# Patient Record
Sex: MALE | Race: WHITE | Employment: OTHER | URBAN - METROPOLITAN AREA
[De-identification: names, ages, dates, MRNs, and addresses within clinical notes are randomized per-mention and may not be internally consistent; named-entity substitution may affect disease eponyms.]

---

## 2025-05-17 ENCOUNTER — APPOINTMENT (OUTPATIENT)
Dept: GENERAL RADIOLOGY | Age: 75
End: 2025-05-17
Payer: MEDICARE

## 2025-05-17 ENCOUNTER — APPOINTMENT (OUTPATIENT)
Dept: CT IMAGING | Age: 75
End: 2025-05-17
Payer: MEDICARE

## 2025-05-17 ENCOUNTER — HOSPITAL ENCOUNTER (OUTPATIENT)
Age: 75
Setting detail: OBSERVATION
Discharge: HOME | End: 2025-05-19
Attending: EMERGENCY MEDICINE | Admitting: HOSPITALIST
Payer: MEDICARE

## 2025-05-17 DIAGNOSIS — I65.01 OCCLUSION OF RIGHT VERTEBRAL ARTERY: Primary | ICD-10-CM

## 2025-05-17 DIAGNOSIS — I63.011 CEREBROVASCULAR ACCIDENT (CVA) DUE TO THROMBOSIS OF RIGHT VERTEBRAL ARTERY (HCC): ICD-10-CM

## 2025-05-17 PROBLEM — G45.9 TIA (TRANSIENT ISCHEMIC ATTACK): Status: ACTIVE | Noted: 2025-05-17

## 2025-05-17 LAB
ALBUMIN SERPL-MCNC: 3.9 G/DL (ref 3.4–5)
ALBUMIN/GLOB SERPL: 1.2 {RATIO} (ref 1.1–2.2)
ALP SERPL-CCNC: 72 U/L (ref 40–129)
ALT SERPL-CCNC: 17 U/L (ref 10–40)
ANION GAP SERPL CALCULATED.3IONS-SCNC: 9 MMOL/L (ref 3–16)
AST SERPL-CCNC: 19 U/L (ref 15–37)
BASOPHILS # BLD: 0.1 K/UL (ref 0–0.2)
BASOPHILS NFR BLD: 0.8 %
BILIRUB SERPL-MCNC: 0.5 MG/DL (ref 0–1)
BUN SERPL-MCNC: 18 MG/DL (ref 7–20)
CALCIUM SERPL-MCNC: 9.7 MG/DL (ref 8.3–10.6)
CHLORIDE SERPL-SCNC: 102 MMOL/L (ref 99–110)
CO2 SERPL-SCNC: 26 MMOL/L (ref 21–32)
CREAT SERPL-MCNC: 1.1 MG/DL (ref 0.8–1.3)
DEPRECATED RDW RBC AUTO: 15.1 % (ref 12.4–15.4)
EOSINOPHIL # BLD: 0.3 K/UL (ref 0–0.6)
EOSINOPHIL NFR BLD: 3.8 %
GFR SERPLBLD CREATININE-BSD FMLA CKD-EPI: 70 ML/MIN/{1.73_M2}
GLUCOSE BLD-MCNC: 224 MG/DL (ref 70–99)
GLUCOSE SERPL-MCNC: 215 MG/DL (ref 70–99)
HCT VFR BLD AUTO: 40 % (ref 40.5–52.5)
HGB BLD-MCNC: 13.4 G/DL (ref 13.5–17.5)
LYMPHOCYTES # BLD: 1.3 K/UL (ref 1–5.1)
LYMPHOCYTES NFR BLD: 18.4 %
MCH RBC QN AUTO: 28.8 PG (ref 26–34)
MCHC RBC AUTO-ENTMCNC: 33.6 G/DL (ref 31–36)
MCV RBC AUTO: 85.7 FL (ref 80–100)
MONOCYTES # BLD: 0.5 K/UL (ref 0–1.3)
MONOCYTES NFR BLD: 7 %
NEUTROPHILS # BLD: 5.1 K/UL (ref 1.7–7.7)
NEUTROPHILS NFR BLD: 70 %
PERFORMED ON: ABNORMAL
PLATELET # BLD AUTO: 226 K/UL (ref 135–450)
PMV BLD AUTO: 7.8 FL (ref 5–10.5)
POTASSIUM SERPL-SCNC: 4.1 MMOL/L (ref 3.5–5.1)
PROT SERPL-MCNC: 7.1 G/DL (ref 6.4–8.2)
RBC # BLD AUTO: 4.67 M/UL (ref 4.2–5.9)
SODIUM SERPL-SCNC: 137 MMOL/L (ref 136–145)
TROPONIN, HIGH SENSITIVITY: 20 NG/L (ref 0–22)
TROPONIN, HIGH SENSITIVITY: 33 NG/L (ref 0–22)
WBC # BLD AUTO: 7.2 K/UL (ref 4–11)

## 2025-05-17 PROCEDURE — 94640 AIRWAY INHALATION TREATMENT: CPT

## 2025-05-17 PROCEDURE — 94761 N-INVAS EAR/PLS OXIMETRY MLT: CPT

## 2025-05-17 PROCEDURE — G0378 HOSPITAL OBSERVATION PER HR: HCPCS

## 2025-05-17 PROCEDURE — 2500000003 HC RX 250 WO HCPCS: Performed by: HOSPITALIST

## 2025-05-17 PROCEDURE — 70498 CT ANGIOGRAPHY NECK: CPT

## 2025-05-17 PROCEDURE — 84484 ASSAY OF TROPONIN QUANT: CPT

## 2025-05-17 PROCEDURE — 6370000000 HC RX 637 (ALT 250 FOR IP): Performed by: HOSPITALIST

## 2025-05-17 PROCEDURE — 70450 CT HEAD/BRAIN W/O DYE: CPT

## 2025-05-17 PROCEDURE — 71045 X-RAY EXAM CHEST 1 VIEW: CPT

## 2025-05-17 PROCEDURE — 80053 COMPREHEN METABOLIC PANEL: CPT

## 2025-05-17 PROCEDURE — 99285 EMERGENCY DEPT VISIT HI MDM: CPT

## 2025-05-17 PROCEDURE — 6360000004 HC RX CONTRAST MEDICATION: Performed by: PHYSICIAN ASSISTANT

## 2025-05-17 PROCEDURE — 93005 ELECTROCARDIOGRAM TRACING: CPT | Performed by: EMERGENCY MEDICINE

## 2025-05-17 PROCEDURE — 85025 COMPLETE CBC W/AUTO DIFF WBC: CPT

## 2025-05-17 PROCEDURE — 6370000000 HC RX 637 (ALT 250 FOR IP): Performed by: PHYSICIAN ASSISTANT

## 2025-05-17 RX ORDER — ONDANSETRON 2 MG/ML
4 INJECTION INTRAMUSCULAR; INTRAVENOUS EVERY 6 HOURS PRN
Status: DISCONTINUED | OUTPATIENT
Start: 2025-05-17 | End: 2025-05-19 | Stop reason: HOSPADM

## 2025-05-17 RX ORDER — ATORVASTATIN CALCIUM 80 MG/1
80 TABLET, FILM COATED ORAL NIGHTLY
Status: DISCONTINUED | OUTPATIENT
Start: 2025-05-17 | End: 2025-05-19 | Stop reason: HOSPADM

## 2025-05-17 RX ORDER — ASPIRIN 325 MG
325 TABLET ORAL ONCE
Status: COMPLETED | OUTPATIENT
Start: 2025-05-17 | End: 2025-05-17

## 2025-05-17 RX ORDER — IOPAMIDOL 755 MG/ML
75 INJECTION, SOLUTION INTRAVASCULAR
Status: COMPLETED | OUTPATIENT
Start: 2025-05-17 | End: 2025-05-17

## 2025-05-17 RX ORDER — ENOXAPARIN SODIUM 100 MG/ML
40 INJECTION SUBCUTANEOUS DAILY
Status: DISCONTINUED | OUTPATIENT
Start: 2025-05-18 | End: 2025-05-19 | Stop reason: HOSPADM

## 2025-05-17 RX ORDER — POLYETHYLENE GLYCOL 3350 17 G/17G
17 POWDER, FOR SOLUTION ORAL DAILY PRN
Status: DISCONTINUED | OUTPATIENT
Start: 2025-05-17 | End: 2025-05-19 | Stop reason: HOSPADM

## 2025-05-17 RX ORDER — LISINOPRIL 5 MG/1
5 TABLET ORAL EVERY MORNING
Status: DISCONTINUED | OUTPATIENT
Start: 2025-05-18 | End: 2025-05-19 | Stop reason: HOSPADM

## 2025-05-17 RX ORDER — SODIUM CHLORIDE 0.9 % (FLUSH) 0.9 %
5-40 SYRINGE (ML) INJECTION EVERY 12 HOURS SCHEDULED
Status: DISCONTINUED | OUTPATIENT
Start: 2025-05-17 | End: 2025-05-19 | Stop reason: HOSPADM

## 2025-05-17 RX ORDER — ONDANSETRON 4 MG/1
4 TABLET, ORALLY DISINTEGRATING ORAL EVERY 8 HOURS PRN
Status: DISCONTINUED | OUTPATIENT
Start: 2025-05-17 | End: 2025-05-19 | Stop reason: HOSPADM

## 2025-05-17 RX ORDER — INSULIN LISPRO 100 [IU]/ML
0-8 INJECTION, SOLUTION INTRAVENOUS; SUBCUTANEOUS
Status: DISCONTINUED | OUTPATIENT
Start: 2025-05-17 | End: 2025-05-19 | Stop reason: HOSPADM

## 2025-05-17 RX ORDER — CALCIUM CARBONATE/VITAMIN D3 600 MG-10
1 TABLET ORAL EVERY MORNING
COMMUNITY

## 2025-05-17 RX ORDER — ASPIRIN 300 MG/1
300 SUPPOSITORY RECTAL DAILY
Status: DISCONTINUED | OUTPATIENT
Start: 2025-05-18 | End: 2025-05-19 | Stop reason: HOSPADM

## 2025-05-17 RX ORDER — SODIUM CHLORIDE 0.9 % (FLUSH) 0.9 %
5-40 SYRINGE (ML) INJECTION PRN
Status: DISCONTINUED | OUTPATIENT
Start: 2025-05-17 | End: 2025-05-19 | Stop reason: HOSPADM

## 2025-05-17 RX ORDER — DEXTROSE MONOHYDRATE 100 MG/ML
INJECTION, SOLUTION INTRAVENOUS CONTINUOUS PRN
Status: DISCONTINUED | OUTPATIENT
Start: 2025-05-17 | End: 2025-05-19 | Stop reason: HOSPADM

## 2025-05-17 RX ORDER — BUDESONIDE AND FORMOTEROL FUMARATE DIHYDRATE 160; 4.5 UG/1; UG/1
2 AEROSOL RESPIRATORY (INHALATION)
Status: DISCONTINUED | OUTPATIENT
Start: 2025-05-17 | End: 2025-05-19 | Stop reason: HOSPADM

## 2025-05-17 RX ORDER — GLUCAGON 1 MG/ML
1 KIT INJECTION PRN
Status: DISCONTINUED | OUTPATIENT
Start: 2025-05-17 | End: 2025-05-19 | Stop reason: HOSPADM

## 2025-05-17 RX ORDER — ASPIRIN 81 MG/1
81 TABLET, CHEWABLE ORAL DAILY
Status: DISCONTINUED | OUTPATIENT
Start: 2025-05-18 | End: 2025-05-19 | Stop reason: HOSPADM

## 2025-05-17 RX ORDER — SODIUM CHLORIDE 9 MG/ML
INJECTION, SOLUTION INTRAVENOUS PRN
Status: DISCONTINUED | OUTPATIENT
Start: 2025-05-17 | End: 2025-05-19 | Stop reason: HOSPADM

## 2025-05-17 RX ORDER — ATORVASTATIN CALCIUM 20 MG/1
20 TABLET, FILM COATED ORAL EVERY MORNING
COMMUNITY

## 2025-05-17 RX ADMIN — Medication 2 PUFF: at 20:07

## 2025-05-17 RX ADMIN — ATORVASTATIN CALCIUM 80 MG: 80 TABLET, FILM COATED ORAL at 21:46

## 2025-05-17 RX ADMIN — INSULIN LISPRO 2 UNITS: 100 INJECTION, SOLUTION INTRAVENOUS; SUBCUTANEOUS at 21:46

## 2025-05-17 RX ADMIN — ASPIRIN 325 MG: 325 TABLET ORAL at 16:00

## 2025-05-17 RX ADMIN — SODIUM CHLORIDE, PRESERVATIVE FREE 10 ML: 5 INJECTION INTRAVENOUS at 21:46

## 2025-05-17 RX ADMIN — IOPAMIDOL 75 ML: 755 INJECTION, SOLUTION INTRAVENOUS at 14:42

## 2025-05-17 ASSESSMENT — PAIN - FUNCTIONAL ASSESSMENT: PAIN_FUNCTIONAL_ASSESSMENT: NONE - DENIES PAIN

## 2025-05-17 NOTE — ED NOTES
Patient Name: Eric Christina  : 1950 74 y.o.  MRN: [id number]  ED Room #: ED-0021/21     Chief complaint:   Chief Complaint   Patient presents with    Dizziness     In by ems, pt states he started with dizziness and light-headed, like he was about to pass out about 1140am start time .     Hospital Problem/Diagnosis:   Hospital Problems           Last Modified POA    * (Principal) TIA (transient ischemic attack) 2025 Yes         O2 Flow Rate:    (if applicable)  Cardiac Rhythm:   (if applicable)  Active LDA's:   Peripheral IV 25 Right Antecubital (Active)   Site Assessment Clean, dry & intact 25 1347   Line Status Blood return noted 25 1347   Line Care Cap changed 25 1347            How does patient ambulate? Low Fall Risk (Ambulates by themselves without support    2. How does patient take pills? Whole with Water    3. Is patient alert? Alert    4. Is patient oriented? To Person, To Place, To Time, and To Situation    5.   Patient arrived from:  home  Facility Name: ___________________________________________    6. If patient is disoriented or from a Skill Nursing Facility has family been notified of admission? No    7. Patient belongings? Belongings: Cell Phone and Clothing    Disposition of belongings? Kept with Patient     8. Any specific patient or family belongings/needs/dynamics?   a. none    9. Miscellaneous comments/pending orders?  a. none      If there are any additional questions please reach out to the Emergency Department.

## 2025-05-17 NOTE — ED PROVIDER NOTES
[facility] EMERGENCY DEPARTMENT  EMERGENCY DEPARTMENT ENCOUNTER        Pt Name: Eric Christina  MRN: [id number]  Birthdate 1950  Date of evaluation: 5/17/2025  Provider: Betsy Adams PA-C  PCP: Pineda Hunt MD  Note Started: 1:52 PM EDT 5/17/25       I have seen and evaluated this patient with my supervising physician Vidya Handy MD.      CHIEF COMPLAINT       Chief Complaint   Patient presents with    Dizziness     In by ems, pt states he started with dizziness and light-headed, like he was about to pass out about 1140am start time .       HISTORY OF PRESENT ILLNESS: 1 or more Elements     History From: patient   Limitations to history : None    Eric Christina is a 74 y.o. male who presents for evaluation of sudden onset lightheadedness that started around 1130 this afternoon.  He had been standing outside for about 20 minutes prior to onset of symptoms.  States it lasted approximately 45 minutes.  States he has bouts of dizziness as lightheadedness frequently but it is never been this severe or lasted this long.  He denies loss of consciousness.  States that he sat down, started pouring sweat and became nauseous, no vomiting.  No spinning type dizziness.  No focal weakness, visual disturbances, facial asymmetry, speech difficulty or syncope.  No chest pain or shortness of breath.  Patient is now feeling completely normal.  No other complaints or concerns at this time.    Nursing Notes were all reviewed and agreed with or any disagreements were addressed in the HPI.    REVIEW OF SYSTEMS :      Review of Systems   Constitutional:  Positive for diaphoresis. Negative for appetite change, chills and fever.   HENT:  Negative for congestion and rhinorrhea.    Eyes:  Negative for visual disturbance.   Respiratory:  Negative for cough, shortness of breath and wheezing.    Cardiovascular:  Negative for chest pain.   Gastrointestinal:  Positive for nausea. Negative for abdominal pain,

## 2025-05-17 NOTE — PROGRESS NOTES
Pharmacy Home Medication Reconciliation Note    A medication reconciliation has been completed for Eric Christina 1950    Pharmacy: [address]  Information provided by: patient    The patient's home medication list is as follows:  No current facility-administered medications on file prior to encounter.     Current Outpatient Medications on File Prior to Encounter   Medication Sig Dispense Refill    Omega 3 1200 MG CAPS Take 1 capsule by mouth every morning      atorvastatin (LIPITOR) 20 MG tablet Take 1 tablet by mouth every morning      ADVAIR DISKUS 250-50 MCG/DOSE AEPB INHALE 1 PUFF INTO THE LUNGS TWO TIMES A DAY 60 each 5    Calcium Carbonate-Vit D-Min (CALTRATE 600+D PLUS) 600-400 MG-UNIT TABS Take 1 tablet by mouth every morning      lisinopril (PRINIVIL;ZESTRIL) 5 MG tablet Take 1 tablet by mouth every morning      metformin (GLUCOPHAGE) 500 MG tablet Take 3 tablets by mouth daily (with breakfast)      pseudoephedrine-guaifenesin (MUCINEX D)  MG TB12 Take 2 tablets by mouth every 12 hours as needed for Congestion      [DISCONTINUED] fluticasone-salmeterol (ADVAIR DISKUS) 250-50 MCG/DOSE MISC Inhale 1 Puff into the lungs 2 times daily.      [DISCONTINUED] FISH OIL by Does not apply route daily.         Of note, patient took all AM meds except Advair prior to ED arrival.    Timing of last doses updated.    Thank you,  Davy Jones, Pharmacy Intern

## 2025-05-17 NOTE — H&P
mouth every morning      metformin (GLUCOPHAGE) 500 MG tablet Take 3 tablets by mouth daily (with breakfast)      pseudoephedrine-guaifenesin (MUCINEX D)  MG TB12 Take 2 tablets by mouth every 12 hours as needed for Congestion         Allergies:  No Known Allergies     Social History:   reports that he has never smoked. He has never been exposed to tobacco smoke. He has never used smokeless tobacco. He reports current alcohol use. He reports that he does not use drugs.     Family History:  family history is not on file. ,     Physical Exam:  BP (!) 159/85   Pulse 79   Temp 98.6 °F (37 °C)   Resp 19   Ht 1.778 m (5' 10\")   Wt 90.7 kg (200 lb)   SpO2 93%   BMI 28.70 kg/m²     General appearance:  Appears comfortable. Well nourished  Eyes: Sclera clear, pupils equal  ENT: Moist mucus membranes, no thrush. Trachea midline.  Cardiovascular: Regular rhythm, normal S1, S2. No murmur, gallop, rub. No edema in lower extremities  Respiratory: Clear to auscultation bilaterally, no wheeze, good inspiratory effort  Gastrointestinal: Abdomen soft, non-tender, not distended, normal bowel sounds  Musculoskeletal: No cyanosis in digits, neck supple  Neurology: Cranial nerves grossly intact. Alert and oriented in time, place and person. No speech or motor deficits  Psychiatry: Appropriate affect. Not agitated  Skin: Warm, dry, normal turgor, no rash  NIH scale of 1  Labs:  CBC:   Lab Results   Component Value Date/Time    WBC 7.2 05/17/2025 01:49 PM    RBC 4.67 05/17/2025 01:49 PM    HGB 13.4 05/17/2025 01:49 PM    HCT 40.0 05/17/2025 01:49 PM    MCV 85.7 05/17/2025 01:49 PM    MCH 28.8 05/17/2025 01:49 PM    MCHC 33.6 05/17/2025 01:49 PM    RDW 15.1 05/17/2025 01:49 PM     05/17/2025 01:49 PM    MPV 7.8 05/17/2025 01:49 PM     BMP:    Lab Results   Component Value Date/Time     05/17/2025 01:49 PM    K 4.1 05/17/2025 01:49 PM     05/17/2025 01:49 PM    CO2 26 05/17/2025 01:49 PM    BUN 18 05/17/2025

## 2025-05-17 NOTE — PLAN OF CARE
Problem: Chronic Conditions and Co-morbidities  Goal: Patient's chronic conditions and co-morbidity symptoms are monitored and maintained or improved  Outcome: Progressing     Problem: Discharge Planning  Goal: Discharge to home or other facility with appropriate resources  Outcome: Progressing     Problem: Neurosensory - Adult  Goal: Achieves stable or improved neurological status  Outcome: Progressing     Problem: Cardiovascular - Adult  Goal: Maintains optimal cardiac output and hemodynamic stability  Outcome: Progressing  Goal: Absence of cardiac dysrhythmias or at baseline  Outcome: Progressing     Problem: Metabolic/Fluid and Electrolytes - Adult  Goal: Electrolytes maintained within normal limits  Outcome: Progressing  Goal: Glucose maintained within prescribed range  Outcome: Progressing     Problem: Safety - Adult  Goal: Free from fall injury  Outcome: Progressing

## 2025-05-18 LAB
ANION GAP SERPL CALCULATED.3IONS-SCNC: 11 MMOL/L (ref 3–16)
BUN SERPL-MCNC: 17 MG/DL (ref 7–20)
CALCIUM SERPL-MCNC: 9 MG/DL (ref 8.3–10.6)
CHLORIDE SERPL-SCNC: 101 MMOL/L (ref 99–110)
CHOLEST SERPL-MCNC: 226 MG/DL (ref 0–199)
CO2 SERPL-SCNC: 24 MMOL/L (ref 21–32)
CREAT SERPL-MCNC: 0.9 MG/DL (ref 0.8–1.3)
DEPRECATED RDW RBC AUTO: 15 % (ref 12.4–15.4)
EKG ATRIAL RATE: 79 BPM
EKG DIAGNOSIS: NORMAL
EKG P AXIS: 67 DEGREES
EKG P-R INTERVAL: 182 MS
EKG Q-T INTERVAL: 410 MS
EKG QRS DURATION: 102 MS
EKG QTC CALCULATION (BAZETT): 470 MS
EKG R AXIS: 10 DEGREES
EKG T AXIS: 141 DEGREES
EKG VENTRICULAR RATE: 79 BPM
EST. AVERAGE GLUCOSE BLD GHB EST-MCNC: 246 MG/DL
GFR SERPLBLD CREATININE-BSD FMLA CKD-EPI: 89 ML/MIN/{1.73_M2}
GLUCOSE BLD-MCNC: 138 MG/DL (ref 70–99)
GLUCOSE BLD-MCNC: 212 MG/DL (ref 70–99)
GLUCOSE BLD-MCNC: 235 MG/DL (ref 70–99)
GLUCOSE BLD-MCNC: 244 MG/DL (ref 70–99)
GLUCOSE SERPL-MCNC: 136 MG/DL (ref 70–99)
HBA1C MFR BLD: 10.2 %
HCT VFR BLD AUTO: 39.4 % (ref 40.5–52.5)
HDLC SERPL-MCNC: 38 MG/DL (ref 40–60)
HGB BLD-MCNC: 13 G/DL (ref 13.5–17.5)
LDLC SERPL CALC-MCNC: 157 MG/DL
MCH RBC QN AUTO: 28.9 PG (ref 26–34)
MCHC RBC AUTO-ENTMCNC: 33.1 G/DL (ref 31–36)
MCV RBC AUTO: 87.5 FL (ref 80–100)
PERFORMED ON: ABNORMAL
PLATELET # BLD AUTO: 212 K/UL (ref 135–450)
PMV BLD AUTO: 7.6 FL (ref 5–10.5)
POTASSIUM SERPL-SCNC: 4.1 MMOL/L (ref 3.5–5.1)
RBC # BLD AUTO: 4.5 M/UL (ref 4.2–5.9)
SODIUM SERPL-SCNC: 136 MMOL/L (ref 136–145)
TRIGL SERPL-MCNC: 155 MG/DL (ref 0–150)
VLDLC SERPL CALC-MCNC: 31 MG/DL
WBC # BLD AUTO: 5 K/UL (ref 4–11)

## 2025-05-18 PROCEDURE — G0378 HOSPITAL OBSERVATION PER HR: HCPCS

## 2025-05-18 PROCEDURE — 80061 LIPID PANEL: CPT

## 2025-05-18 PROCEDURE — 80048 BASIC METABOLIC PNL TOTAL CA: CPT

## 2025-05-18 PROCEDURE — 2500000003 HC RX 250 WO HCPCS: Performed by: HOSPITALIST

## 2025-05-18 PROCEDURE — 36415 COLL VENOUS BLD VENIPUNCTURE: CPT

## 2025-05-18 PROCEDURE — 94761 N-INVAS EAR/PLS OXIMETRY MLT: CPT

## 2025-05-18 PROCEDURE — 92523 SPEECH SOUND LANG COMPREHEN: CPT

## 2025-05-18 PROCEDURE — 94640 AIRWAY INHALATION TREATMENT: CPT

## 2025-05-18 PROCEDURE — 96372 THER/PROPH/DIAG INJ SC/IM: CPT

## 2025-05-18 PROCEDURE — 83036 HEMOGLOBIN GLYCOSYLATED A1C: CPT

## 2025-05-18 PROCEDURE — 6370000000 HC RX 637 (ALT 250 FOR IP): Performed by: HOSPITALIST

## 2025-05-18 PROCEDURE — 85027 COMPLETE CBC AUTOMATED: CPT

## 2025-05-18 PROCEDURE — 97165 OT EVAL LOW COMPLEX 30 MIN: CPT

## 2025-05-18 PROCEDURE — 99223 1ST HOSP IP/OBS HIGH 75: CPT | Performed by: PSYCHIATRY & NEUROLOGY

## 2025-05-18 PROCEDURE — 97530 THERAPEUTIC ACTIVITIES: CPT

## 2025-05-18 PROCEDURE — 6360000002 HC RX W HCPCS: Performed by: HOSPITALIST

## 2025-05-18 PROCEDURE — 92610 EVALUATE SWALLOWING FUNCTION: CPT

## 2025-05-18 PROCEDURE — 93010 ELECTROCARDIOGRAM REPORT: CPT | Performed by: INTERNAL MEDICINE

## 2025-05-18 RX ADMIN — Medication 2 PUFF: at 08:08

## 2025-05-18 RX ADMIN — ENOXAPARIN SODIUM 40 MG: 100 INJECTION SUBCUTANEOUS at 08:49

## 2025-05-18 RX ADMIN — INSULIN LISPRO 2 UNITS: 100 INJECTION, SOLUTION INTRAVENOUS; SUBCUTANEOUS at 21:48

## 2025-05-18 RX ADMIN — ATORVASTATIN CALCIUM 80 MG: 80 TABLET, FILM COATED ORAL at 20:20

## 2025-05-18 RX ADMIN — SODIUM CHLORIDE, PRESERVATIVE FREE 10 ML: 5 INJECTION INTRAVENOUS at 20:20

## 2025-05-18 RX ADMIN — ASPIRIN 81 MG: 81 TABLET, CHEWABLE ORAL at 08:50

## 2025-05-18 RX ADMIN — INSULIN LISPRO 2 UNITS: 100 INJECTION, SOLUTION INTRAVENOUS; SUBCUTANEOUS at 12:23

## 2025-05-18 RX ADMIN — SODIUM CHLORIDE, PRESERVATIVE FREE 10 ML: 5 INJECTION INTRAVENOUS at 08:50

## 2025-05-18 RX ADMIN — Medication 2 PUFF: at 20:38

## 2025-05-18 RX ADMIN — INSULIN LISPRO 2 UNITS: 100 INJECTION, SOLUTION INTRAVENOUS; SUBCUTANEOUS at 17:10

## 2025-05-18 RX ADMIN — LISINOPRIL 5 MG: 5 TABLET ORAL at 08:50

## 2025-05-18 NOTE — PROGRESS NOTES
Physical Therapy  Orders received and chart reviewed. OT completed evaluation and reports pt was independent without any further needs. Orders discharged.   Thanks,  Latasha Will

## 2025-05-18 NOTE — PROGRESS NOTES
ADVANCED CARE PLANNING    Name:Eric Christina       :  1950              MRN:  [id number]        Purpose of Encounter: Advanced care planning in light of hospitalization  Parties In Attendance: Patient,    Decisional Capacity: Yes  Subjective: Patient  understand that this conversation is to address long term care goal  Objective:   There is an extra vaccine for uncontrolled hypertension permissive hypertension.  Given his high comorbid condition diabetes  Goals of Care Determination: Patient would pursue CPR and Intubation if required.       Advanced Care Planning Documents: = Full code    Vidya Handy MD  2025 4:48 PM    Plan: Will notify Pineda Hunt MD of change in care plan. Will look at further interventions as needed.   Code Status: At this time patient wishes to be Full Code  Time Spent with Patient: 17 minutes      Electronically signed by Vidya Handy MD on 2025 at 4:48 PM  Thank you Pineda Hunt MD for the opportunity to be involved in this patient's care.

## 2025-05-18 NOTE — PROGRESS NOTES
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   has no past surgical history on file.  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 MRI pending  Pain:pt reports \" neuropathy in feet act up\"                                 Recommendations      Recommended Diet and Intervention 2025:  Diet Solids Recommendation:  Regular texture diet  Liquid Consistency Recommendation:  Thin liquids  Recommended form of Meds: as desired       Compensatory strategies: Eat or Feed Slowly, Remain Upright 30-45 min , Upright as possible with all PO intake     Discharge Recommendations:  Do not anticipate dysphagia tx needs at d/c    History/Course of Treatment     H&P:    Eric Christina is a 74 y.o. male who presented with presents today with complaints of dizziness lightheadedness.   Patient describes dizziness lightheadedness. Lasted for about 45 minutes and patient does endorse that she does get dizziness on and off but has never lasted this long. No loss of conscious she sat down she was pouring and sweat became nauseous. No chest pain no shortness of breath no prodromal syndrome no loss of conscious   Consults: neurology: consult documentation noted    Imaging:  Chest X-ray:

## 2025-05-18 NOTE — PROGRESS NOTES
V2.0    [facility] Progress Note      Name:  Eric Christina /Age/Sex: 1950  (74 y.o. male)   MRN & CSN:  [id number] & [id number] Encounter Date/Time: 2025 11:35 AM EDT   Location:  [facility]3366/3366-01 PCP: Pineda Hunt MD     Attending:Vidya Handy MD       Hospital Day: 2    Assessment and Recommendations   Eric Christina is a 74 y.o. male who presents with TIA (transient ischemic attack)      Plan:     Dizziness.  Currently symptoms resolved.  Concern for posterior circulation stroke initially.  CT head noted.  Admit for further evaluation.  Continue aspirin and statin.  MRI echo ordered neurology consult    Hypertension  Blood pressure still running high we will readjust slowly  4 hours    Diabetes.  Continue sliding scale monitor for hypoglycemia        Diet ADULT DIET; Regular; 5 carb choices (75 gm/meal)   DVT Prophylaxis    Code Status Full Code   Disposition 1 more day pending MRI echo         Personally reviewed Lab Studies and Imaging         Subjective:     Chief Complaint:     Eric Christina is a 74 y.o. male who presents with resting no symptoms currently.      Review of Systems:      Pertinent positives and negatives discussed in HPI    Objective:     Intake/Output Summary (Last 24 hours) at 2025 1135  Last data filed at 2025 0850  Gross per 24 hour   Intake 255 ml   Output --   Net 255 ml      Vitals:   Vitals:    25 0417 25 0730 25 0749 25 0809   BP: (!) 166/87 (!) 174/98     Pulse: 72 70  70   Resp: 16 16  16   Temp: 97.7 °F (36.5 °C) 97.7 °F (36.5 °C)     TempSrc: Oral Oral     SpO2: 96% 97%  97%   Weight:   85.7 kg (189 lb)    Height:             Physical Exam:      General: NAD  Eyes: EOMI  ENT: neck supple  Cardiovascular: Regular rate.  Respiratory: Clear to auscultation  Gastrointestinal: Soft, non tender  Genitourinary: no suprapubic tenderness  Musculoskeletal: No edema  Skin: warm, dry  Neuro: Alert.  Psych: Mood appropriate.

## 2025-05-18 NOTE — ED PROVIDER NOTES
In addition to the advanced practice provider, I personally saw Eric Christina and performed a substantive portion of the visit including all aspects of the medical decision making. I made/approved the management plan and take responsibility for the patient management    Briefly, this is a 74 y.o. male here for dizziness.  Patient states he has had issues with dizziness ongoing off and on for the past several weeks, typically worsened in the morning and with position change.  Feels lightheaded, denies ever having any loss of consciousness.  Symptoms are more severe beginning around 1130 today at which point he came to the hospital for evaluation.  At time of my evaluation he reports feeling completely back to normal and has no other symptomatic complaints.    On exam, patient afebrile and nontoxic. No distress. Heart RRR. Lungs CTAB. Abdomen soft, nondistended, nontender to palpation in all quadrants. A&Ox4. Speech clear, face symmetric. PERRL, no nystagmus, normal test of skew. CN 2-12 intact. 5/5 motor and sensation grossly intact all extremities. No pronator drift. Normal finger to nose, normal heel to shin. Normal gait observed.        EKG  EKG was reviewed by emergency department physician in the absence of a cardiologist    Narrow complex sinus rhythm, rate 79, normal axis, normal IL and QRS intervals, normal Qtc, no specific ST elevations or depressions, TWI I and aVL, impression NSR with nonspecific T wave morphology and voltage criteria for LVH, no STEMI, no comparison available      Screenings  NIH Stroke Scale  NIH Stroke Scale Assessed: Yes  Interval: Reassessment  Level of Consciousness (1a): Alert  LOC Questions (1b): Answers both correctly  LOC Commands (1c): Performs both tasks correctly  Best Gaze (2): Normal  Visual (3): No visual loss  Facial Palsy (4): Normal symmetrical movement  Motor Arm, Left (5a): No drift  Motor Arm, Right (5b): No drift  Motor Leg, Left (6a): No drift  Motor Leg, Right

## 2025-05-18 NOTE — CONSULTS
In patient Neurology consult        [facility] Neurology      MD Eric Hayes Laekaterina  1950    Date of Service: 5/18/2025    Referring Physician: Vidya Handy MD      Reason for the consult and CC: Acute dizziness    HPI:   The patient is a 74 y.o.  years old maleWith history of depression and diabetes who was admitted to the hospital yesterday with acute dizziness.  Onset 2 days ago.  Description started with some lightheadedness with spinning sensation.  Degree was.  Duration was 1 hour.  No fall or injury or chest pain.  No headache.  No weakness or numbness.  Came to the ED for evaluation.  Initial blood was elevated.  He was admitted to the hospital.    CTA showed no acute stroke   But right MCA occlusion. Today he feels better.  Almost back to his baseline.       Constitutional:   Vitals:    05/18/25 0417 05/18/25 0730 05/18/25 0749 05/18/25 0809   BP: (!) 166/87 (!) 174/98     Pulse: 72 70  70   Resp: 16 16  16   Temp: 97.7 °F (36.5 °C) 97.7 °F (36.5 °C)     TempSrc: Oral Oral     SpO2: 96% 97%  97%   Weight:   85.7 kg (189 lb)    Height:             I personally reviewed and updated social history, past medical history, medications, allergy, surgical history, and family history as documented in the patient's electronic health records.       ROS: 10-14 ROS reviewed with the patient/nurse/family which were unremarkable except mentioned in H&P.    General appearance:  Normal development and appear in no acute distress.   Mental Status:   Oriented to person, place, problem, and time.    Memory: Good immediate recall.  Intact remote memory  Normal attention span and concentration.  Language: intact naming, repeating and fluency   Good fund of Knowledge. Aware of current events and vocabulary   Cranial Nerves:   II: Visual fields: Full. Pupils: equal, round, reactive to light, bilaterally  III,IV,VI: Extra Ocular Movements are intact. No nystagmus  V: Facial sensation is intact  VII:

## 2025-05-18 NOTE — PROGRESS NOTES
[facility] - Inpatient Rehabilitation Department   Phone: [phone]    Occupational Therapy    [x] Initial Evaluation            [] Daily Treatment Note         [] Discharge Summary      Patient: Eric Christina   : 1950   MRN: [id number]   Date of Service:  2025    Admitting Diagnosis:  TIA (transient ischemic attack)  Current Admission Summary: Eric Christina is a 74 y.o. male who presents for evaluation of sudden onset lightheadedness that started around 1130 this afternoon.  He had been standing outside for about 20 minutes prior to onset of symptoms.  States it lasted approximately 45 minutes.  States he has bouts of dizziness as lightheadedness frequently but it is never been this severe or lasted this long.  He denies loss of consciousness.  States that he sat down, started pouring sweat and became nauseous, no vomiting.  No spinning type dizziness.  No focal weakness, visual disturbances, facial asymmetry, speech difficulty or syncope.  No chest pain or shortness of breath.  Patient is now feeling completely normal.  No other complaints or concerns at this time.     Past Medical History:  has a past medical history of Depression, Diabetes mellitus (HCC), and Hypertension.  Past Surgical History:  has no past surgical history on file.    Discharge Recommendations: Eric Christina scored a 24/ on the [facility] ADL Inpatient form.  At this time, no further OT is recommended upon discharge due to patient presenting at their baseline functioning level.  Recommend patient returns to prior setting with prior services.        DME Required For Discharge: no DME required at discharge    Precautions/Restrictions: no restrictions  Weight Bearing Restrictions: no restrictions  [] Right Upper Extremity  [] Left Upper Extremity [] Right Lower Extremity  [] Left Lower Extremity     Required Braces/Orthotics: no braces required   [] Right  [] Left  Positional Restrictions:no positional

## 2025-05-19 ENCOUNTER — APPOINTMENT (OUTPATIENT)
Dept: MRI IMAGING | Age: 75
End: 2025-05-19
Payer: MEDICARE

## 2025-05-19 VITALS
OXYGEN SATURATION: 97 % | SYSTOLIC BLOOD PRESSURE: 150 MMHG | BODY MASS INDEX: 27.06 KG/M2 | WEIGHT: 189 LBS | DIASTOLIC BLOOD PRESSURE: 73 MMHG | TEMPERATURE: 97.8 F | HEART RATE: 73 BPM | RESPIRATION RATE: 16 BRPM | HEIGHT: 70 IN

## 2025-05-19 PROBLEM — R42 DIZZINESS: Status: ACTIVE | Noted: 2025-05-19

## 2025-05-19 PROBLEM — I10 HTN (HYPERTENSION), BENIGN: Status: ACTIVE | Noted: 2025-05-19

## 2025-05-19 PROBLEM — E13.8 DM (DIABETES MELLITUS), SECONDARY, WITH COMPLICATIONS (HCC): Status: ACTIVE | Noted: 2025-05-19

## 2025-05-19 LAB
GLUCOSE BLD-MCNC: 150 MG/DL (ref 70–99)
GLUCOSE BLD-MCNC: 152 MG/DL (ref 70–99)
GLUCOSE BLD-MCNC: 210 MG/DL (ref 70–99)
PERFORMED ON: ABNORMAL

## 2025-05-19 PROCEDURE — 94640 AIRWAY INHALATION TREATMENT: CPT

## 2025-05-19 PROCEDURE — G0378 HOSPITAL OBSERVATION PER HR: HCPCS

## 2025-05-19 PROCEDURE — APPNB30 APP NON BILLABLE TIME 0-30 MINS

## 2025-05-19 PROCEDURE — APPSS30 APP SPLIT SHARED TIME 16-30 MINUTES

## 2025-05-19 PROCEDURE — 2500000003 HC RX 250 WO HCPCS: Performed by: HOSPITALIST

## 2025-05-19 PROCEDURE — 99233 SBSQ HOSP IP/OBS HIGH 50: CPT | Performed by: PSYCHIATRY & NEUROLOGY

## 2025-05-19 PROCEDURE — 6360000002 HC RX W HCPCS: Performed by: HOSPITALIST

## 2025-05-19 PROCEDURE — 96374 THER/PROPH/DIAG INJ IV PUSH: CPT

## 2025-05-19 PROCEDURE — 96372 THER/PROPH/DIAG INJ SC/IM: CPT

## 2025-05-19 PROCEDURE — 6370000000 HC RX 637 (ALT 250 FOR IP): Performed by: HOSPITALIST

## 2025-05-19 PROCEDURE — 70551 MRI BRAIN STEM W/O DYE: CPT

## 2025-05-19 PROCEDURE — 94761 N-INVAS EAR/PLS OXIMETRY MLT: CPT

## 2025-05-19 RX ORDER — LISINOPRIL 10 MG/1
10 TABLET ORAL EVERY MORNING
Qty: 30 TABLET | Refills: 0 | Status: SHIPPED | OUTPATIENT
Start: 2025-05-19

## 2025-05-19 RX ORDER — AMLODIPINE BESYLATE 5 MG/1
5 TABLET ORAL DAILY
Qty: 30 TABLET | Refills: 3 | Status: SHIPPED | OUTPATIENT
Start: 2025-05-19

## 2025-05-19 RX ORDER — ASPIRIN 81 MG/1
81 TABLET, CHEWABLE ORAL DAILY
Qty: 30 TABLET | Refills: 3 | Status: SHIPPED | OUTPATIENT
Start: 2025-05-19

## 2025-05-19 RX ORDER — HYDRALAZINE HYDROCHLORIDE 20 MG/ML
10 INJECTION INTRAMUSCULAR; INTRAVENOUS ONCE
Status: COMPLETED | OUTPATIENT
Start: 2025-05-19 | End: 2025-05-19

## 2025-05-19 RX ADMIN — ASPIRIN 81 MG: 81 TABLET, CHEWABLE ORAL at 12:36

## 2025-05-19 RX ADMIN — SODIUM CHLORIDE, PRESERVATIVE FREE 10 ML: 5 INJECTION INTRAVENOUS at 12:36

## 2025-05-19 RX ADMIN — INSULIN LISPRO 2 UNITS: 100 INJECTION, SOLUTION INTRAVENOUS; SUBCUTANEOUS at 12:36

## 2025-05-19 RX ADMIN — ENOXAPARIN SODIUM 40 MG: 100 INJECTION SUBCUTANEOUS at 12:36

## 2025-05-19 RX ADMIN — LISINOPRIL 5 MG: 5 TABLET ORAL at 12:36

## 2025-05-19 RX ADMIN — HYDRALAZINE HYDROCHLORIDE 10 MG: 20 INJECTION INTRAMUSCULAR; INTRAVENOUS at 17:21

## 2025-05-19 RX ADMIN — Medication 2 PUFF: at 09:23

## 2025-05-19 NOTE — PROGRESS NOTES
Reic LESLIE Christina  Neurology Follow-up  [facility] Neurology    Date of Service: 5/19/2025    Subjective:   CC: Follow up today regarding: Acute dizziness    Events noted. Chart and lab reviewed.  Patient seen this morning he is resting in bed.  Awaiting echocardiogram and MRI.  Labs reviewed A1c and lipid panel with patient.  Blood pressure ranging in systolic 150-170s. He reports he feels back to his baseline.  He offers no new complaints today.      ROS : A 10-12 system review obtained and updated today and is unremarkable except as mentioned  in my interval history.     Past medical history, social history, medication and family history reviewed.       Objective:  Exam:   Constitutional:   Vitals:    05/19/25 0745 05/19/25 0800 05/19/25 0811 05/19/25 0923   BP: (!) 172/96      Pulse:   62 76   Resp: 16   16   Temp: 97.6 °F (36.4 °C)      TempSrc: Oral      SpO2: 97%   97%   Weight:  85.7 kg (189 lb)     Height:         General appearance:  Normal development and appear in no acute distress.   Mental Status:   Oriented to person, place, problem, and time.    Memory: Good immediate recall.  Intact remote memory  Normal attention span and concentration.  Language: intact naming, repeating and fluency   Good fund of Knowledge.   Cranial Nerves:   II:   Pupils: equal, round, reactive to light  III,IV,VI: Extra Ocular Movements are intact. No nystagmus  V: Facial sensation is intact  VII: Facial strength and movements: intact and symmetric  XII: Tongue movements are normal  Musculoskeletal: 5/5 in all 4 extremities.   Tone: Normal tone.   Reflexes: Symmetric 2+ in both arms and legs.  Coordination: no pronator drift, no dysmetria with FNF  Sensation: normal.  Gait/Posture: steady gait    MDM:      A. Problems (any 1)    High:    [x] Acute/Chronic Illness/injury posing threat to life or bodily function:    [] Severe exacerbation of chronic illness:      Moderate:    []     1 or more chronic illness with

## 2025-05-19 NOTE — CARE COORDINATION
Discharge Planning Note:    Chart reviewed and it appears that patient has minimal needs for discharge at this time. Risk Score N/A in OBS %     Primary Care Physician is Pineda Hunt MD    Primary insurance is [facility] medicare    Please notify case management if any discharge needs are identified.      Case management will continue to follow progress and update discharge plan as needed.    Electronically signed by Zeferino Petit on 5/19/2025 at 8:47 AM

## 2025-05-19 NOTE — PLAN OF CARE
Problem: Chronic Conditions and Co-morbidities  Goal: Patient's chronic conditions and co-morbidity symptoms are monitored and maintained or improved  5/19/2025 1707 by Katheryn Hollis RN  Outcome: Adequate for Discharge  5/19/2025 1149 by Katheryn Hollis RN  Outcome: Progressing     Problem: Discharge Planning  Goal: Discharge to home or other facility with appropriate resources  5/19/2025 1707 by Katheryn Hollis RN  Outcome: Adequate for Discharge  5/19/2025 1149 by Katheryn Hollis RN  Outcome: Progressing     Problem: Neurosensory - Adult  Goal: Achieves stable or improved neurological status  5/19/2025 1707 by Katheryn Hollis RN  Outcome: Adequate for Discharge  5/19/2025 1149 by Katheryn Hollis RN  Outcome: Progressing     Problem: Cardiovascular - Adult  Goal: Maintains optimal cardiac output and hemodynamic stability  5/19/2025 1707 by Katheryn Hollis RN  Outcome: Adequate for Discharge  5/19/2025 1149 by Katheryn Hollis RN  Outcome: Progressing  Goal: Absence of cardiac dysrhythmias or at baseline  5/19/2025 1707 by Katheryn Hollis RN  Outcome: Adequate for Discharge  5/19/2025 1149 by Katheryn Hollis RN  Outcome: Progressing     Problem: Metabolic/Fluid and Electrolytes - Adult  Goal: Electrolytes maintained within normal limits  5/19/2025 1707 by Katheryn Hollis RN  Outcome: Adequate for Discharge  5/19/2025 1149 by Katheryn Hollis RN  Outcome: Progressing  Goal: Glucose maintained within prescribed range  5/19/2025 1707 by Katheryn Hollis RN  Outcome: Adequate for Discharge  5/19/2025 1149 by Katheryn Hollis RN  Outcome: Progressing     Problem: Safety - Adult  Goal: Free from fall injury  5/19/2025 1707 by Katheryn Hollis RN  Outcome: Adequate for Discharge  5/19/2025 1149 by Katheryn Hollis RN  Outcome: Progressing

## 2025-05-19 NOTE — PROGRESS NOTES
Data- discharge order received, pt verbalized agreement to discharge, disposition to previous residence, no needs for HHC/DME.     Action- discharge instructions prepared and given to patient, pt verbalized understanding. Medication information packet given r/t NEW and/or CHANGED prescriptions emphasizing name/purpose/side effects, pt verbalized understanding. Discharge instruction summary: Diet- regular, Activity- as tolerated, Primary Care Physician as follows: Pineda Hunt [phone] f/u appointment to be scheduled by patient, immunizations reviewed and up to date, prescription medications filled and to be picked up by patient at preferred pharmacy. Inpatient surgical procedure precautions reviewed.    1. WEIGHT: Admit Weight - Scale: 90.7 kg (200 lb) (05/17/25 1321)        Today  Weight - Scale: 85.7 kg (189 lb) (05/19/25 0800)       2. O2 SAT.: SpO2: 97 % (05/19/25 1615)    Response- Pt belongings gathered, IV removed. Disposition is home (no HHC/DME needs), transported with belongings, taken to lobby via w/c w/ belongings and grandson, no complications.

## 2025-05-19 NOTE — PLAN OF CARE
Problem: Chronic Conditions and Co-morbidities  Goal: Patient's chronic conditions and co-morbidity symptoms are monitored and maintained or improved  5/19/2025 1149 by Katheryn Hollis RN  Outcome: Progressing  5/19/2025 1149 by Katheryn Hollis RN  Outcome: Progressing     Problem: Discharge Planning  Goal: Discharge to home or other facility with appropriate resources  5/19/2025 1149 by Katheryn Hollis RN  Outcome: Progressing  5/19/2025 1149 by Katheryn Hollis RN  Outcome: Progressing     Problem: Neurosensory - Adult  Goal: Achieves stable or improved neurological status  5/19/2025 1149 by Katheryn Hollis RN  Outcome: Progressing  5/19/2025 1149 by Katheryn Hollis RN  Outcome: Progressing     Problem: Cardiovascular - Adult  Goal: Maintains optimal cardiac output and hemodynamic stability  5/19/2025 1149 by Katheryn Hollis RN  Outcome: Progressing  5/19/2025 1149 by Katheryn Hollis RN  Outcome: Progressing  Goal: Absence of cardiac dysrhythmias or at baseline  5/19/2025 1149 by Katheryn Hollis RN  Outcome: Progressing  5/19/2025 1149 by Katheryn Hollis RN  Outcome: Progressing     Problem: Metabolic/Fluid and Electrolytes - Adult  Goal: Electrolytes maintained within normal limits  5/19/2025 1149 by Katheryn Hollis RN  Outcome: Progressing  5/19/2025 1149 by Katheryn Hollis RN  Outcome: Progressing  Goal: Glucose maintained within prescribed range  5/19/2025 1149 by Katheryn Hollis RN  Outcome: Progressing  5/19/2025 1149 by Katheryn Hollis RN  Outcome: Progressing     Problem: Safety - Adult  Goal: Free from fall injury  5/19/2025 1149 by Katheryn Hollis RN  Outcome: Progressing  5/19/2025 1149 by Katheryn Hollis RN  Outcome: Progressing